# Patient Record
(demographics unavailable — no encounter records)

---

## 2024-11-06 NOTE — HISTORY OF PRESENT ILLNESS
[Has the patient missed work because of the injury/illness?] : The patient has missed work because of the injury/illness. [FreeTextEntry1] : The patient is a 65 year old female who fell at work. 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 SHe is ambulating without a cane today.  She feels that the knee is stiff. She has not been going to physical therapy.  They state that her recent DEXA scan showed osteoporosis and she is under medical treatment from her primary care doctor. [FreeTextEntry6] : 3/4/24

## 2024-11-06 NOTE — REASON FOR VISIT
[Follow-Up Visit] : a follow-up visit for [Workers' Comp: Date of Injury: _______] : This visit is related to worker's compensation. Date of Injury: [unfilled] [Other: ______] : provided by RAYRAY [Time Spent: ____ minutes] : Total time spent using  services: [unfilled] minutes. The patient's primary language is not English thus required  services. [FreeTextEntry2] : left knee injury  [Interpreters_IDNumber] : [id number] [Interpreters_FullName] : Nigel [TWNoteComboBox1] : Citizen of [place]

## 2024-11-06 NOTE — DISCUSSION/SUMMARY
[de-identified] : 64 yo female who is s/p left distal femur fracture. Her fracture appears well healed on X-rays. She was again recommended physical therapy to work on range of motion, strengthening and gait training. She will follow up in 8 weeks or as directed by Worker's Compensation for evaluation unless she is able to return to work. If she feels comfortable, she is allowed to return to work. however, unless light is available, I would still consider her temporarily 100% disabled. Her work involves multiple hours of standing every shift.   The patient was given the opportunity to ask questions and all questions were answered to their satisfaction.  Rene Medrano MD Orthopaedic Trauma Surgeon [facility]  Orthopaedic Trauma, [facility]

## 2024-11-06 NOTE — PHYSICAL EXAM
[de-identified] : Physical Exam: General: Well appearing, no acute distress, A&O Neurologic: A&Ox3, No focal deficits Head: NCAT without abrasions, lacerations, or ecchymosis to head, face, or scalp Respiratory: Equal chest wall expansion bilaterally, no accessory muscle use Lymphatic: No lymphadenopathy palpated Skin: Warm and dry Psychiatric: Normal mood and affect  Left lower extremity: mild tenderness to palpation around the knee especially the lateral epicondyle of the femur SILT s/s/sp/dp/t Fires EHL/FHL/GS/TA 2+ DP/PT pulse brisk capillary refill [de-identified] : 2 views of the left knee obtained today show a healed distal femur fracture in the setting of osteoporosis

## 2024-12-04 NOTE — REASON FOR VISIT
[Follow-Up Visit] : a follow-up visit for [Workers' Comp: Date of Injury: _______] : This visit is related to worker's compensation. Date of Injury: [unfilled] [Other: ______] : provided by RAYRAY [FreeTextEntry2] : left knee injury  [Interpreters_IDNumber] : [id number] [Interpreters_FullName] : Nigel [TWNoteComboBox1] : [place]

## 2024-12-04 NOTE — HISTORY OF PRESENT ILLNESS
[Has the patient missed work because of the injury/illness?] : The patient has missed work because of the injury/illness. [FreeTextEntry1] : The patient is a 65 year old female who fell at work. 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 She is ambulating without a cane today.  She feels her knee continues to be stiff and she cannot stand for long periods of time. She has not been going to physical therapy as she states it was not approved.  [FreeTextEntry6] : 3/4/24

## 2024-12-04 NOTE — DISCUSSION/SUMMARY
[de-identified] : 66 yo female who is s/p left distal femur fracture. Her fracture appears well healed on X-rays. She was again recommended physical therapy to work on range of motion, strengthening and gait training. She will follow up in 8 weeks or as directed by Worker's Compensation for evaluation unless she is able to return to work. If she feels comfortable, she is allowed to return to work. however, unless light duty is available, I would still consider her temporarily 100% disabled. Her work involves multiple hours of standing every shift.   The patient was given the opportunity to ask questions and all questions were answered to their satisfaction

## 2024-12-04 NOTE — PHYSICAL EXAM
[de-identified] : Physical Exam: General: Well appearing, no acute distress, A&O Neurologic: A&Ox3, No focal deficits Head: NCAT without abrasions, lacerations, or ecchymosis to head, face, or scalp Respiratory: Equal chest wall expansion bilaterally, no accessory muscle use Lymphatic: No lymphadenopathy palpated Skin: Warm and dry Psychiatric: Normal mood and affect  Left lower extremity: mild tenderness to palpation around the knee especially the lateral epicondyle of the femur SILT s/s/sp/dp/t Fires EHL/FHL/GS/TA 2+ DP/PT pulse brisk capillary refill [de-identified] : 2 views of the left knee obtained today show a healed distal femur fracture in the setting of osteoporosis

## 2025-01-29 NOTE — PHYSICAL EXAM
[de-identified] : Physical Exam: General: Well appearing, no acute distress, A&O Neurologic: A&Ox3, No focal deficits Head: NCAT without abrasions, lacerations, or ecchymosis to head, face, or scalp Respiratory: Equal chest wall expansion bilaterally, no accessory muscle use Lymphatic: No lymphadenopathy palpated Skin: Warm and dry Psychiatric: Normal mood and affect  Left lower extremity: No tenderness SILT s/s/sp/dp/t Fires EHL/FHL/GS/TA 2+ DP/PT pulse brisk capillary refill [de-identified] : Full ROM to left knee [de-identified] : 2 views of the left knee obtained today show a healed distal femur fracture in the setting of osteoporosis.

## 2025-01-29 NOTE — DISCUSSION/SUMMARY
[de-identified] : 66 yo female who is s/p left distal femur fracture. Her fracture again appears well healed on X-rays, and she overall appears well healed clinically. I discussed with patient it appears she has reached MMI and if she feels comfortable, she is allowed to return to work. She may follow up PRN.  The patient was given the opportunity to ask questions, and all questions were answered to their satisfaction.  Osteopenia and osteoporosis are significant risk factors for fragility fractures. Given the type of fracture that has occurred, I would highly recommend that the patient followup with their primary care physician to discuss treatment for low bone mineral density. We discussed the benefits of these medications frequently far outweigh the small risks. Their primary care physician can call the office with any specific questions or concerns.  The patient was given the opportunity to ask questions and all questions were answered to their satisfaction.  Rene Medrano MD Orthopaedic Trauma Surgeon [facility]  Orthopaedic Trauma, [facility]

## 2025-01-29 NOTE — HISTORY OF PRESENT ILLNESS
[FreeTextEntry1] : The patient is a 65-year-old female who fell at work. 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 She is ambulating without a cane today. She reports stiffness to her knee has improved and she is now able to tolerate walking more. She still notes pain with standing for long periods but overall has improved.

## 2025-01-29 NOTE — REASON FOR VISIT
[Follow-Up Visit] : a follow-up visit for [Workers' Comp: Date of Injury: _______] : This visit is related to worker's compensation. Date of Injury: [unfilled] [Other: ____] : [unfilled] [Language Line ] : provided by [employer]   [Interpreters_IDNumber] : [id number] [TWNoteComboBox1] : [place]